# Patient Record
Sex: FEMALE | Race: WHITE | NOT HISPANIC OR LATINO | Employment: FULL TIME | URBAN - METROPOLITAN AREA
[De-identification: names, ages, dates, MRNs, and addresses within clinical notes are randomized per-mention and may not be internally consistent; named-entity substitution may affect disease eponyms.]

---

## 2024-10-31 ENCOUNTER — OFFICE VISIT (OUTPATIENT)
Dept: ENDOCRINOLOGY | Facility: CLINIC | Age: 39
End: 2024-10-31

## 2024-10-31 DIAGNOSIS — E28.2 PCOS (POLYCYSTIC OVARIAN SYNDROME): Primary | ICD-10-CM

## 2024-10-31 NOTE — PROGRESS NOTES
Audiovisual Virtual Visit New Patient    Subjective:      Chief Complaint: No chief complaint on file.      HPI: Shirley Henry is a 39 y.o. female who is seen for an initial evaluation via audiovisual telemedicine for oligomenorrhea     Reviewed past medical, family, social history and updated as appropriate.    Difficulty with her menstrual cycles. Reports currently on birth control due to oligomenorrhea for the past 16 months.   Has noted slowing down of terminal growth  No menstrual cycles on OCPs, has painful cramps.     PCOS diagnostic criteria includes:  Diagnosed many years ago   1) oligomenorrhea (reports surgery to remove cysts at age 15/16 years)  2) clinical hyperandrogenism - chin, lip and neck -> currently on spironolactone 100 mg daily (16 months)  3) PCOM on transvaginal ultrasound  7/2024    Evaluation includes:  Normal: TSH   2 hr OGTT:  A1C: 5.7%   LDDST:   MAURI-IR:  Lipid panel:138/94/57/63    Current management:  Mounjaro 2.5 mg weekly -> restarted four months ago  Spironolactone 50 mg one tablet twice daily     BMI 44  Weight loss: >100 lbs (used mounjaro for 9 months at 15 mg weekly stopped about one year ago).    Exercise:     Family history:  Maternal grandmother with T2DM  Denies PCOS     Eyes: 2023 - normal, next appointment due this year  Feet: Foot exam deferred due to virtual visit; will do at next in person visit.  Kidneys: Urine microalbumin/creatinine is up to date  HbA1c: At goal - Check in 6 months  Lipids: LDL at goal, not on statin       Review of Systems   Constitutional:  Negative for fever.   HENT:  Negative for congestion.    Eyes:  Negative for visual disturbance.   Respiratory:  Negative for shortness of breath.    Cardiovascular:  Negative for chest pain.   Gastrointestinal:  Negative for abdominal pain.   Genitourinary:  Negative for dysuria.   Musculoskeletal:  Negative for arthralgias.   Skin:  Negative for rash.   Neurological:  Negative for weakness.   Hematological:   Does not bruise/bleed easily.   Psychiatric/Behavioral:  Negative for sleep disturbance.        Objective:     BP Readings from Last 5 Encounters:   07/23/24 116/82   07/11/24 110/70   07/05/23 118/78       Physical exam was not performed and vitals were not obtained due to this being a telemedicine (virtual) visit.    Wt Readings from Last 10 Encounters:   07/23/24 1318 127.5 kg (281 lb)   07/11/24 0955 129.3 kg (285 lb)   07/05/23 1356 124.7 kg (275 lb)       Lab Results   Component Value Date    HGBA1C 5.7 (H) 06/25/2024     Lab Results   Component Value Date    CHOL 138 06/25/2024    CHOL 154 12/13/2022    HDL 57 06/25/2024    HDL 31 (L) 12/13/2022    LDLCALC 63 06/25/2024    LDLCALC 101 (H) 12/13/2022    TRIG 94 06/25/2024    TRIG 119 12/13/2022     Lab Results   Component Value Date    TSH 2.220 06/25/2024      Lab Results   Component Value Date    CREATRANDUR 110.0 06/25/2024    MICALBCREAT 7 06/25/2024       Assessment/Plan:     PCOS (polycystic ovarian syndrome)  Diagnosed based on:  Oligomenorrhea   Clinical hyperandrogenism   Multiple follicles ovaries bilateraly - no images for review     Unclear of secondary evaluation  Currently on boris and OCPs   Reassurance, can continue to use OCPs  Continue to use mounjaro   Has lost quite a bit of weight and feeling much better      The patient location is: [place]/home  The chief complaint leading to consultation is: see above   Visit type: Virtual visit with synchronous audio and video  Total time spent with patient: 16 min    RTC in 6 months, PCOS      Annette Villalobos MD

## 2024-11-04 PROBLEM — E28.2 PCOS (POLYCYSTIC OVARIAN SYNDROME): Status: ACTIVE | Noted: 2024-11-04

## 2024-11-04 NOTE — ASSESSMENT & PLAN NOTE
Diagnosed based on:  Oligomenorrhea   Clinical hyperandrogenism   Multiple follicles ovaries bilateraly - no images for review     Unclear of secondary evaluation  Currently on boris and OCPs   Reassurance, can continue to use OCPs  Continue to use mounjaro   Has lost quite a bit of weight and feeling much better